# Patient Record
Sex: FEMALE | Race: WHITE | NOT HISPANIC OR LATINO | URBAN - METROPOLITAN AREA
[De-identification: names, ages, dates, MRNs, and addresses within clinical notes are randomized per-mention and may not be internally consistent; named-entity substitution may affect disease eponyms.]

---

## 2020-06-13 ENCOUNTER — HOSPITAL ENCOUNTER (OUTPATIENT)
Facility: CLINIC | Age: 40
Discharge: HOME | End: 2020-06-13
Attending: FAMILY MEDICINE
Payer: COMMERCIAL

## 2020-06-13 ENCOUNTER — APPOINTMENT (OUTPATIENT)
Dept: RADIOLOGY | Age: 40
End: 2020-06-13
Attending: NURSE PRACTITIONER
Payer: COMMERCIAL

## 2020-06-13 VITALS
TEMPERATURE: 98.2 F | RESPIRATION RATE: 16 BRPM | SYSTOLIC BLOOD PRESSURE: 132 MMHG | DIASTOLIC BLOOD PRESSURE: 81 MMHG | HEART RATE: 64 BPM | OXYGEN SATURATION: 100 %

## 2020-06-13 DIAGNOSIS — S99.922A TOE INJURY, LEFT, INITIAL ENCOUNTER: Primary | ICD-10-CM

## 2020-06-13 PROCEDURE — 73660 X-RAY EXAM OF TOE(S): CPT | Mod: LT | Performed by: NURSE PRACTITIONER

## 2020-06-13 PROCEDURE — S9083 URGENT CARE CENTER GLOBAL: HCPCS | Performed by: NURSE PRACTITIONER

## 2020-06-13 PROCEDURE — 99203 OFFICE O/P NEW LOW 30 MIN: CPT | Performed by: NURSE PRACTITIONER

## 2020-06-13 ASSESSMENT — ENCOUNTER SYMPTOMS
DIARRHEA: 0
BACK PAIN: 0
SINUS PAIN: 0
RHINORRHEA: 0
MYALGIAS: 1
NUMBNESS: 0
FEVER: 0
SHORTNESS OF BREATH: 0
WEAKNESS: 0
WOUND: 0
NECK STIFFNESS: 0
HEADACHES: 0
ABDOMINAL PAIN: 0
ADENOPATHY: 0
VOMITING: 0
NECK PAIN: 0
CHILLS: 0
COUGH: 0
NAUSEA: 0
SINUS PRESSURE: 0
ARTHRALGIAS: 0
LIGHT-HEADEDNESS: 0
JOINT SWELLING: 1
SORE THROAT: 0
DIZZINESS: 0

## 2020-06-13 NOTE — ED PROVIDER NOTES
HPI     Chief Complaint   Patient presents with   • Toe Injury     pinky toe , numb and tingly and bruised , felt a crunch and heard        40 y/o female c/o left 5th toe injury. Reports yesterday afternoon she was reaching for something in her house when she heard a crack and felt pain immediately in her left 5th toe. Admits to tingling, but denies fall/injury, weakness, or any other injury/pain.            Patient History     Past Medical History:   Diagnosis Date   • Acute Lyme disease        History reviewed. No pertinent surgical history.    History reviewed. No pertinent family history.    Social History     Tobacco Use   • Smoking status: Never Smoker   • Smokeless tobacco: Never Used   Substance Use Topics   • Alcohol use: Not on file   • Drug use: Not on file       Systems Reviewed from Nursing Triage:  Allergies  Meds  Problems  Med Hx  Surg Hx          Review of Systems     Review of Systems   Constitutional: Negative for chills and fever.   HENT: Negative for congestion, rhinorrhea, sinus pressure, sinus pain and sore throat.    Respiratory: Negative for cough and shortness of breath.    Cardiovascular: Negative for chest pain.   Gastrointestinal: Negative for abdominal pain, diarrhea, nausea and vomiting.   Musculoskeletal: Positive for joint swelling (left 5th toe) and myalgias (left 5th toe). Negative for arthralgias, back pain, neck pain and neck stiffness.   Skin: Negative for rash and wound.   Neurological: Negative for dizziness, weakness, light-headedness, numbness and headaches.   Hematological: Negative for adenopathy.   All other systems reviewed and are negative.       Physical Exam     ED Triage Vitals [06/13/20 1335]   Temp Heart Rate Resp BP SpO2   36.8 °C (98.2 °F) 64 16 132/81 100 %      Temp Source Heart Rate Source Patient Position BP Location FiO2 (%) (Set)   Oral -- Sitting Right upper arm --                     Patient Vitals for the past 24 hrs:   BP Temp Temp src Pulse Resp  SpO2   06/13/20 1335 132/81 36.8 °C (98.2 °F) Oral 64 16 100 %                                          Physical Exam   Constitutional: She is oriented to person, place, and time. Vital signs are normal. She appears well-developed and well-nourished. She is cooperative.  Non-toxic appearance. She does not appear ill.   HENT:   Head: Normocephalic and atraumatic.   Right Ear: External ear normal.   Left Ear: External ear normal.   Neck: Normal range of motion. Neck supple.   Cardiovascular: Normal rate.   Pulmonary/Chest: Effort normal.   Musculoskeletal:        Feet:    Neurological: She is alert and oriented to person, place, and time.   Skin: Skin is warm and dry.   Psychiatric: She has a normal mood and affect. Thought content normal.            Procedures    Labs Reviewed - No data to display    X-RAY TOE LEFT 2+ VIEWS   Final Result   IMPRESSION:   No fracture                  ED Course & MDM     MDM  Number of Diagnoses or Management Options  Toe injury, left, initial encounter: new and requires workup  Diagnosis management comments: Left 5th toe xray shows no fracture. Mundo tape applied.  Recommend RICE, tylenol/motrin prn for pain.   F/u with PCP and/or ortho prn (number provided).  Pt verbalized understanding and agrees with plan of care.       Amount and/or Complexity of Data Reviewed  Tests in the radiology section of CPT®: reviewed    Patient Progress  Patient progress: stable           Clinical Impressions as of Jun 13 1418   Toe injury, left, initial encounter        Parvin Thomas CRNP  06/13/20 1413

## 2020-06-13 NOTE — DISCHARGE INSTRUCTIONS
Your Xrays did not reveal any fractures  Your injury may take up to 2-6 weeks to heal  Use ice for 15-20 minutes to painful area 4-6 times a day to help with swelling and pain  Use Advil or Aleve for pain and inflammation  Elevate area as able  Weight bearing as able but try and stay off of it as much as possible for the next few days  Buddy tape to help with discomfort.  Follow-up with your primary care provider and/or ortho as needed if you are not improving

## 2020-06-14 NOTE — ED ATTESTATION NOTE
I was immediately available to provide supervision and direction for the care of the patient.     Rigoberto Cool MD  06/13/20 2019

## 2022-12-02 ENCOUNTER — APPOINTMENT (RX ONLY)
Dept: URBAN - METROPOLITAN AREA CLINIC 28 | Facility: CLINIC | Age: 42
Setting detail: DERMATOLOGY
End: 2022-12-02

## 2022-12-02 DIAGNOSIS — L81.4 OTHER MELANIN HYPERPIGMENTATION: ICD-10-CM

## 2022-12-02 DIAGNOSIS — Z71.89 OTHER SPECIFIED COUNSELING: ICD-10-CM

## 2022-12-02 DIAGNOSIS — D22 MELANOCYTIC NEVI: ICD-10-CM

## 2022-12-02 PROBLEM — D22.5 MELANOCYTIC NEVI OF TRUNK: Status: ACTIVE | Noted: 2022-12-02

## 2022-12-02 PROCEDURE — ? COUNSELING

## 2022-12-02 PROCEDURE — ? TREATMENT REGIMEN

## 2022-12-02 PROCEDURE — ? SUNSCREEN RECOMMENDATIONS

## 2022-12-02 PROCEDURE — 99203 OFFICE O/P NEW LOW 30 MIN: CPT

## 2022-12-02 ASSESSMENT — LOCATION SIMPLE DESCRIPTION DERM
LOCATION SIMPLE: UPPER BACK
LOCATION SIMPLE: LEFT UPPER BACK

## 2022-12-02 ASSESSMENT — LOCATION ZONE DERM
LOCATION ZONE: TRUNK
LOCATION ZONE: TRUNK

## 2022-12-02 ASSESSMENT — LOCATION DETAILED DESCRIPTION DERM
LOCATION DETAILED: SUPERIOR THORACIC SPINE
LOCATION DETAILED: LEFT MID-UPPER BACK